# Patient Record
Sex: MALE | Race: WHITE
[De-identification: names, ages, dates, MRNs, and addresses within clinical notes are randomized per-mention and may not be internally consistent; named-entity substitution may affect disease eponyms.]

---

## 2022-10-24 ENCOUNTER — HOSPITAL ENCOUNTER (EMERGENCY)
Dept: HOSPITAL 46 - ED | Age: 52
LOS: 1 days | Discharge: HOME | End: 2022-10-25
Payer: COMMERCIAL

## 2022-10-24 VITALS — HEIGHT: 72 IN | BODY MASS INDEX: 25.33 KG/M2 | WEIGHT: 187 LBS

## 2022-10-24 DIAGNOSIS — F10.139: Primary | ICD-10-CM

## 2022-10-24 NOTE — XMS
PreManage Notification: RUKHSANA GRIFFITH MRN:[id number]
 
Security Information
 
Security Events
No recent Security Events currently on file
 
 
 
CRITERIA MET
------------
- [facility]
- [facility] - 2 Visits in 30 Days
 
 
CARE PROVIDERS
There are no care providers on record at this time.
 
Vinay has no Care Guidelines for this patient.
 
EGISELL VISIT COUNT (12 MO.)
-------------------------------------------------------------------------------------
5 [facility]
 
1 [facility]
 
[facility]
-------------------------------------------------------------------------------------
TOTAL 7
-------------------------------------------------------------------------------------
NOTE: Visits indicate total known visits.
 
ED/C VISIT TRACKING (12 MO.)
-------------------------------------------------------------------------------------
[facility]2022 [facility]
 
[facility]
 
COMPLAINT:
- VOMITING
-------------------------------------------------------------------------------------
10/18/2022 09:59
[facility]     [place]
 
TYPE: Emergency
 
DIAGNOSES:
- withdrawals
- Alcohol dependence, uncomplicated
-------------------------------------------------------------------------------------
10/17/2022 15:35
[facility]     [place]
 
TYPE: Emergency
 
COMPLAINT:
- withdrawals
 
 
DIAGNOSES:
- withdrawals
-------------------------------------------------------------------------------------
07/03/2022 10:14
[facility]
 
TYPE: Emergency
 
DIAGNOSES:
- Alcohol dependence, uncomplicated
- Nausea with vomiting, unspecified
- Homelessness unspecified
- Unspecified asthma, uncomplicated
- Alcoholic hepatitis without ascites
- Nicotine dependence, unspecified, uncomplicated
-------------------------------------------------------------------------------------
06/08/2022 15:49
[facility]     [place]
 
TYPE: Emergency
 
DIAGNOSES:
- VOMITTING
- Viral infection, unspecified
-------------------------------------------------------------------------------------
11/22/2021 09:28
[facility] [place]
 
TYPE: Emergency
 
DIAGNOSES:
- TOOTHPAIN
- Alveolitis of jaws
-------------------------------------------------------------------------------------
11/02/2021 09:31
[facility] [place]
 
TYPE: Emergency
 
DIAGNOSES:
- DENTAL PAIN
- Other specified disorders of teeth and supporting structures
-------------------------------------------------------------------------------------
 
 
INPATIENT VISIT TRACKING (12 MO.)
No inpatient visits to display in this time frame
 
[email or web address]